# Patient Record
Sex: MALE | Race: WHITE
[De-identification: names, ages, dates, MRNs, and addresses within clinical notes are randomized per-mention and may not be internally consistent; named-entity substitution may affect disease eponyms.]

---

## 2017-05-29 ENCOUNTER — HOSPITAL ENCOUNTER (EMERGENCY)
Dept: HOSPITAL 24 - ER | Age: 57
Discharge: HOME | End: 2017-05-29
Payer: COMMERCIAL

## 2017-05-29 VITALS — BODY MASS INDEX: 25.1 KG/M2

## 2017-05-29 VITALS — DIASTOLIC BLOOD PRESSURE: 84 MMHG | SYSTOLIC BLOOD PRESSURE: 140 MMHG

## 2017-05-29 DIAGNOSIS — J40: Primary | ICD-10-CM

## 2017-05-29 DIAGNOSIS — R10.84: ICD-10-CM

## 2017-05-29 LAB
ALBUMIN SERPL BCP-MCNC: 3.4 G/DL (ref 3.4–5)
ALP SERPL-CCNC: 59 UNITS/L (ref 46–116)
ALT SERPL W P-5'-P-CCNC: 18 UNITS/L (ref 12–78)
AST SERPL W P-5'-P-CCNC: 17 UNITS/L (ref 15–37)
BASOPHILS # BLD AUTO: 0.1 X10^3/UL (ref 0–0.1)
BASOPHILS NFR BLD AUTO: 0.7 % (ref 0.2–1)
BUN SERPL-MCNC: 12 MG/DL (ref 7–18)
CALCIUM ALBUM COR SERPL-SCNC: (no result) MG/DL (ref 8.5–10.1)
CALCIUM ALBUM COR SERPL-SCNC: (no result) MMOL/L (ref 136–145)
CALCIUM SERPL-MCNC: 8.2 MG/DL (ref 8.5–10.1)
CHLORIDE SERPL-SCNC: 104 MMOL/L (ref 98–107)
CK MB SERPL-MCNC: < 1 NG/ML (ref 0–4)
CK SERPL-CCNC: 91 UNITS/L (ref 39–308)
CKMB %: 1.1 % (ref ?–4)
CO2 SERPL-SCNC: 24.9 MMOL/L (ref 21–32)
CREAT SERPL-MCNC: 0.95 MG/DL (ref 0.7–1.3)
EGFR  BLACK RACES: > 60 (ref 60–?)
EOSINOPHIL # BLD AUTO: 0 X10^3/UL (ref 0–0.2)
EOSINOPHIL NFR BLD AUTO: 0.2 % (ref 0.9–2.9)
ERYTHROCYTE [DISTWIDTH] IN BLOOD BY AUTOMATED COUNT: 16.1 % (ref 11.6–16.5)
GLUCOSE BLD-SCNC: 95 MG/DL (ref 65–99)
HCT VFR BLD AUTO: 42.9 % (ref 42–54)
HGB BLD-MCNC: 14 G/DL (ref 13.5–18)
LYMPHOCYTES # BLD AUTO: 1.8 X10^3/UL (ref 1.3–2.9)
LYMPHOCYTES NFR BLD AUTO: 19 % (ref 21–51)
MCH RBC QN AUTO: 25.7 PG (ref 27–34)
MCHC RBC AUTO-ENTMCNC: 32.6 G/DL (ref 33–35)
MCV RBC AUTO: 78.7 FL (ref 80–100)
MONOCYTES # BLD AUTO: 0.6 X10^3/UL (ref 0.3–0.8)
MONOCYTES NFR BLD AUTO: 6.7 % (ref 0–13)
NEUTROPHILS # BLD AUTO: 6.9 X10^3/UL (ref 2.2–4.8)
NEUTROPHILS NFR BLD AUTO: 73.4 % (ref 42–75)
PLAT MORPH BLD: NORMAL
PLATELET # BLD: 274 X10^3/UL (ref 150–450)
PMV BLD AUTO: 7.4 FL (ref 7.4–11)
PROT SERPL-MCNC: 6.6 G/DL (ref 6.4–8.2)
RBC # BLD AUTO: 5.45 X10^6/UL (ref 4.7–6)
RBC MORPH BLD: (no result)
RBC MORPH BLD: SLIGHT
SODIUM SERPL-SCNC: 139 MMOL/L (ref 136–145)
TROPONIN I SERPL-MCNC: < 0.02 NG/ML (ref 0–1.5)
WBC NRBC COR # BLD AUTO: 9.4 X10^3/UL (ref 3.6–10)

## 2017-05-29 PROCEDURE — 85025 COMPLETE CBC W/AUTO DIFF WBC: CPT

## 2017-05-29 PROCEDURE — 82553 CREATINE MB FRACTION: CPT

## 2017-05-29 PROCEDURE — 84484 ASSAY OF TROPONIN QUANT: CPT

## 2017-05-29 PROCEDURE — 96374 THER/PROPH/DIAG INJ IV PUSH: CPT

## 2017-05-29 PROCEDURE — 74176 CT ABD & PELVIS W/O CONTRAST: CPT

## 2017-05-29 PROCEDURE — 93005 ELECTROCARDIOGRAM TRACING: CPT

## 2017-05-29 PROCEDURE — 99283 EMERGENCY DEPT VISIT LOW MDM: CPT

## 2017-05-29 PROCEDURE — 96365 THER/PROPH/DIAG IV INF INIT: CPT

## 2017-05-29 PROCEDURE — 73501 X-RAY EXAM HIP UNI 1 VIEW: CPT

## 2017-05-29 PROCEDURE — 96375 TX/PRO/DX INJ NEW DRUG ADDON: CPT

## 2017-05-29 PROCEDURE — 82550 ASSAY OF CK (CPK): CPT

## 2017-05-29 PROCEDURE — 80053 COMPREHEN METABOLIC PANEL: CPT

## 2017-05-29 PROCEDURE — 36415 COLL VENOUS BLD VENIPUNCTURE: CPT

## 2017-05-29 PROCEDURE — 71010: CPT

## 2017-05-29 PROCEDURE — 93010 ELECTROCARDIOGRAM REPORT: CPT

## 2017-05-29 NOTE — RAD
Single view chest series:



Indication: Right flank pain.



Comparison: Rib series dated January 7, 2013.



Findings/impression: There is diffuse peribronchial thickening, suggesting interstitial infection, s
uch as bronchitis. Interstitial edema is considered less likely.



No consolidation, pneumothorax, or pleural effusion is seen. The cardiac silhouette and pulmonary va
scularity are within normal limits. No acute skeletal abnormality is identified.



Reported By:Electronically Signed by JAYY JEFF MD at 5/29/2017 4:48:32 PM

## 2017-05-29 NOTE — CT
Abdomen and pelvis CT without contrast:



Indication: Right flank pain.



Comparison: None available.



Technique: Noncontrast CT imaging of the abdomen and pelvis was performed with multiplanar reformati
ons.



Findings: Apart from mild atelectasis, the imaged thoracic contents are unremarkable. There is mild 
atherosclerosis.



No urinary tract calculi or dilation of the collecting systems is identified. The solid abdominal vi
scera maintain a normal unenhanced CT appearance.



The patient is status post gastric bypass surgery as well as cholecystectomy. The remainder of the g
astrointestinal tract is unremarkable, to include a normal appendix.



The pelvic contents are within normal limits. There is no free fluid or free intraperitoneal air.



No acute skeletal abnormality or worrisome skeletal lesion is identified.



Impression: No acute abdominal or pelvic abnormality. Specifically, no evidence of hydroureteronephr
osis or ureterolithiasis.



Reported By:Electronically Signed by JAYY JEFF MD at 5/29/2017 4:24:04 PM

## 2017-05-29 NOTE — RAD
HISTORY:  Right hip and flank pain



Study: Right hip two views, AP pelvis



Comparison: None



Findings:



A single frontal view of the pelvis demonstrates the pelvic ring to be intact.  No evidence for acut
e cortical disruption or dislocation of the hip can be observed.  Frog leg views of the hip fails to
 demonstrate evidence for fracture or significant joint abnormality.



Impression:

 

1.  Negative exam.





Reported By:Electronically Signed by ZEHRA KATZ MD at 5/29/2017 5:08:26 PM

## 2017-05-29 NOTE — DR.GENAD
HPI





- Complaint/Symptoms


Chief Complaint Doctors Comments: Patient presents with right flank pain since 

early am. He has a history of kidney stones but not recently. He denies any 

recent injury or fever.





PMH





- PMH


Past Medical History: Diabetes


Past Surgical History: Yes


Surgical History: Abdominal Surgery, Weight Loss Surgery





- Family History


Family Medical History: Diabetes Mellitus, Cancer, MI, Hypertension





- Social History


Do you use any recreational Drugs:: No





ROS





- Review of Systems


Constitutional: No Symptoms Reported


Eyes: No Symptoms Reported


ENTM: No Symptoms Reported


Respiratoy: No Symptoms Reported


Cardiovascular: No Symptoms Reported


Gastrointestinal/Abdominal: No Symptoms Reported


Genitourinary: No Symptoms Reported


Neurological: No Symptoms Reported


Musculoskeletal: No Symptoms Reported


Integumentary: No Symptoms Reported


Hematologic/Lymphatic: No Symptoms Reported


Endocrine: No Symptoms Reported


Psychiatric: No Symptoms Reported


All Other Systems: Reviewed and Negative





PE





- Vital Signs


Vitals: 


 





Temperature                      98.0 F


Pulse Rate                       85


Respiratory Rate                 24


Blood Pressure [Left Arm]        130/61


Blood Pressure [Right Arm]       114/76


Blood Pressure                   140/84


O2 Sat by Pulse Oximetry         98











- General


Limitations: No Limitations


General Appearance: Alert, Anxious, In Distress





- Head


Head Exam: Normal Inspection, Atraumatic





- Eyes


Eye exam: Normal Appearance, PERRL, EOMI





- ENT


ENT Exam: Normal Exam


External Ear Exam: Normal External Inspection


TM/Canal Exam: Bilateral Normal


Nose Exam: Normal Nose Exam


Mouth Exam: Normal Inspection


Throat Exam: Normal Inspection





- Neck


Neck Exam: Normal Inspection





- Chest


Chest Inspection: Normal Inspection





- Respiratory


Respiratory Exam: Normal Lung Sounds Bilat


Respiratory Exam: Bilateral Clear to Auscultation





- Cardiovascular


Cardiovascular Exam: Regular Rate, Normal Rhythm





- Abdominal Exam


Abdominal Exam: Normal Inspection, Normal Bowel Sounds


Abdominal Tenderness: negative: RUQ, RLQ, LUQ, LLQ, Epigastrium, Suprapubic, 

Diffuse, Mild, Moderate, Severe, Other





- Extremities


Extremities Exam: Normal Inspection





- Back


Back Exam: Normal Inspection





- Neurologic


Neurological Exam: Alert, Oriented X3, CN II-XII Intact





- Psychiatric


Psychiatric Exam: Normal Affect





- Skin


Skin Exam: Warm, Dry, Intact





Course





- Reevaluation


1st: Improved





ROR





- Labs Reviewed


Result Diagrams: 


 05/29/17 16:43





 05/29/17 16:43


Laboratory: 


 











WBC  9.4 X10^3/uL (3.6-10.0)   05/29/17  16:43    


 


RBC  5.45 X10^6/uL (4.7-6.0)   05/29/17  16:43    


 


Hgb  14.0 g/dL (13.5-18.0)   05/29/17  16:43    


 


Hct  42.9 % (42.0-54.0)   05/29/17  16:43    


 


MCV  78.7 fL (80.0-100.0)  L  05/29/17  16:43    


 


MCH  25.7 pg (27.0-34.0)  L  05/29/17  16:43    


 


MCHC  32.6 g/dL (33.0-35.0)  L  05/29/17  16:43    


 


RDW  16.1 % (11.6-16.5)   05/29/17  16:43    


 


Plt Count  274 X10^3/uL (150.0-450.0)   05/29/17  16:43    


 


Plt Count Comment  Adequate  (ADEQUATE)   05/29/17  16:43    


 


MPV  7.4 fL (7.4-11.0)   05/29/17  16:43    


 


Neut %  73.4 % (42.0-75.0)   05/29/17  16:43    


 


Lymph %  19.0 % (21.0-51.0)  L  05/29/17  16:43    


 


Mono %  6.7 % (0.0-13.0)   05/29/17  16:43    


 


Eos %  0.2 % (0.9-2.9)  L  05/29/17  16:43    


 


Baso %  0.7 % (0.2-1.0)   05/29/17  16:43    


 


Neut #  6.9 x10^3/uL (2.2-4.8)  H  05/29/17  16:43    


 


Lymph #  1.8 X10^3/uL (1.3-2.9)   05/29/17  16:43    


 


Mono #  0.6 x10^3/uL (0.3-0.8)   05/29/17  16:43    


 


Eos #  0.0 x10^3/uL (0.0-0.2)   05/29/17  16:43    


 


Baso #  0.1 X10^3/uL (0.0-0.1)   05/29/17  16:43    


 


Absolute Nucleated RBC  0.0 /100WBC  05/29/17  16:43    


 


Plt Morphology Comment  Normal  (NORMAL)   05/29/17  16:43    


 


RBC Morphology  Abnormal  (NORMAL)   05/29/17  16:43    


 


Hypochromasia  Slight  A  05/29/17  16:43    


 


Poikilocytosis  Slight  A  05/29/17  16:43    


 


Microcytosis  Slight  A  05/29/17  16:43    


 


Sodium  139 mmol/L (136-145)   05/29/17  16:43    


 


Corrected Sodium  TNP   05/29/17  16:43    


 


Potassium  4.1 mmol/L (3.5-5.1)   05/29/17  16:43    


 


Chloride  104 mmol/L ()   05/29/17  16:43    


 


Carbon Dioxide  24.9 mmol/L (21-32)   05/29/17  16:43    


 


BUN  12 mg/dL (7-18)   05/29/17  16:43    


 


Creatinine  0.95 mg/dL (0.70-1.30)   05/29/17  16:43    


 


Est GFR (MDRD) Af Amer  > 60  (>60)   05/29/17  16:43    


 


Est GFR (MDRD) Non-Af  > 60  (>60)   05/29/17  16:43    


 


Glucose  95 mg/dL (65-99)   05/29/17  16:43    


 


Calcium  8.2 mg/dL (8.5-10.1)  L  05/29/17  16:43    


 


Corrected Calcium  TNP   05/29/17  16:43    


 


Total Bilirubin  1.10 mg/dL (0.2-1.0)  H  05/29/17  16:43    


 


AST  17 Units/L (15-37)   05/29/17  16:43    


 


ALT  18 Units/L (12-78)   05/29/17  16:43    


 


Alkaline Phosphatase  59 Units/L ()   05/29/17  16:43    


 


Creatine Kinase  91 Units/L ()   05/29/17  16:43    


 


CK-MB (CK-2)  < 1.0 ng/mL (0-4.0)   05/29/17  16:43    


 


CK/CKMB % Calc  1.1 % (<4)   05/29/17  16:43    


 


Troponin I  < 0.02 ng/mL (0-1.5)   05/29/17  16:43    


 


Total Protein  6.6 g/dL (6.4-8.2)   05/29/17  16:43    


 


Albumin  3.4 g/dL (3.4-5.0)   05/29/17  16:43    


 


Globulin  3.2 g/dL (2.5-4.5)   05/29/17  16:43    


 


Albumin/Globulin Ratio  1.1 Ratio (1.1-2.1)   05/29/17  16:43    














- XRAY


XRAY Interpreted by: Radiologist (Ct Abd/Pelv: Findings: Apart from mild 

atelectasis, the imaged throacic coontents are unremarkable.  There is mild 

atherosclerosis.  No urinary tract calculi or dilation of the collecting 

systems is identified.  The solid abdominal visera maintain  a normal 

ununchanced CT appearance. The patient is status post gastric bypass surgery as 

well as cholecystectomy. The remainder of the gastrointestional tract is 

unremarkable, to include a normal appendix.  The pelvic contents are within 

normal limits.  There is no freee flujid or free intraperitoneal air.  No acute 

skeletal abnormality or worrisome skeletal lesion is identified. Impression: No 

acute abdominal or pelvic abnromality. Specifically, no evidence of 

hydroureteronephrosis of ureterolithiasis.  CxR: There is diffuse peribronchial 

thickening, suggesting interstitial infection, such as bronchitis. Interstitial 

edema is less likely.  No consolidation,pneumothora, or pelural effusion is seen

), Self





- Diagnosis


Discharge Problem: 


 Flank pain, acute, Bronchitis








- Discharge Plan


Condition: Stable





- Follow ups/Referrals


Follow ups/Referrals: 


[facility],None [Primary Care Provider] - 3 days





- Instructions

## 2018-05-03 ENCOUNTER — HOSPITAL ENCOUNTER (OUTPATIENT)
Dept: HOSPITAL 67 - MRI | Age: 58
Discharge: HOME | End: 2018-05-03
Attending: NURSE PRACTITIONER
Payer: COMMERCIAL

## 2018-05-03 DIAGNOSIS — M25.511: Primary | ICD-10-CM

## 2018-11-30 ENCOUNTER — HOSPITAL ENCOUNTER (OUTPATIENT)
Dept: HOSPITAL 67 - CT | Age: 58
Discharge: HOME | End: 2018-11-30
Attending: NURSE PRACTITIONER
Payer: COMMERCIAL

## 2018-11-30 DIAGNOSIS — I65.29: Primary | ICD-10-CM

## 2018-11-30 PROCEDURE — 36415 COLL VENOUS BLD VENIPUNCTURE: CPT

## 2018-11-30 PROCEDURE — 84520 ASSAY OF UREA NITROGEN: CPT

## 2018-11-30 PROCEDURE — 82565 ASSAY OF CREATININE: CPT

## 2018-12-03 ENCOUNTER — HOSPITAL ENCOUNTER (OUTPATIENT)
Dept: HOSPITAL 67 - CT | Age: 58
Discharge: HOME | End: 2018-12-03
Attending: NURSE PRACTITIONER
Payer: COMMERCIAL

## 2018-12-03 DIAGNOSIS — I65.29: Primary | ICD-10-CM

## 2022-10-03 ENCOUNTER — HOSPITAL ENCOUNTER (OUTPATIENT)
Dept: HOSPITAL 67 - RAD | Age: 62
Discharge: HOME | End: 2022-10-03
Payer: COMMERCIAL

## 2022-10-03 DIAGNOSIS — M54.2: ICD-10-CM

## 2022-10-03 DIAGNOSIS — M06.4: Primary | ICD-10-CM

## 2022-10-03 DIAGNOSIS — M54.51: ICD-10-CM
